# Patient Record
Sex: FEMALE | Race: WHITE | Employment: OTHER
[De-identification: names, ages, dates, MRNs, and addresses within clinical notes are randomized per-mention and may not be internally consistent; named-entity substitution may affect disease eponyms.]

---

## 2024-01-25 ENCOUNTER — APPOINTMENT (OUTPATIENT)
Facility: HOSPITAL | Age: 89
End: 2024-01-25
Payer: MEDICARE

## 2024-01-25 ENCOUNTER — HOSPITAL ENCOUNTER (EMERGENCY)
Facility: HOSPITAL | Age: 89
Discharge: HOME | End: 2024-01-25
Attending: EMERGENCY MEDICINE
Payer: MEDICARE

## 2024-01-25 VITALS
HEIGHT: 65 IN | SYSTOLIC BLOOD PRESSURE: 182 MMHG | OXYGEN SATURATION: 94 % | WEIGHT: 182 LBS | HEART RATE: 87 BPM | DIASTOLIC BLOOD PRESSURE: 104 MMHG | RESPIRATION RATE: 16 BRPM | BODY MASS INDEX: 30.32 KG/M2 | TEMPERATURE: 98.4 F

## 2024-01-25 DIAGNOSIS — S09.90XA INJURY OF HEAD, INITIAL ENCOUNTER: Primary | ICD-10-CM
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
## 2024-01-25 PROCEDURE — 72170 X-RAY EXAM OF PELVIS: CPT

## 2024-01-25 PROCEDURE — 71045 X-RAY EXAM CHEST 1 VIEW: CPT

## 2024-01-25 PROCEDURE — 70450 CT HEAD/BRAIN W/O DYE: CPT

## 2024-01-25 PROCEDURE — 99284 EMERGENCY DEPT VISIT MOD MDM: CPT

## 2024-01-25 ASSESSMENT — LIFESTYLE VARIABLES
HOW OFTEN DO YOU HAVE A DRINK CONTAINING ALCOHOL: NEVER
HOW MANY STANDARD DRINKS CONTAINING ALCOHOL DO YOU HAVE ON A TYPICAL DAY: PATIENT DOES NOT DRINK

## 2024-01-25 ASSESSMENT — PAIN - FUNCTIONAL ASSESSMENT: PAIN_FUNCTIONAL_ASSESSMENT: 0-10

## 2024-01-25 ASSESSMENT — PAIN SCALES - GENERAL: PAINLEVEL_OUTOF10: 0

## 2024-01-26 NOTE — ED TRIAGE NOTES
To ED via EMS c/o posterior head and sacral pain s/p fall in the shower. Reports had been laying in shower for approximately 7 hours before being found by family. (-) LOC (-) blood thinners

## 2024-01-26 NOTE — ED PROVIDER NOTES
VIEWS)   Final Result   Bilateral hip osteoarthritis and lumbosacral degenerative disc   disease without acute abnormality.           EMERGENCY DEPARTMENT COURSE and DIFFERENTIAL DIAGNOSIS/MDM   10:46 PM DDx, ED Course, and Reassessment: Patient had a mechanical fall.  She denies any injuries but will assess with chest x-ray and pelvis x-ray and then ambulate.    Clinical Management Tools:      Records Reviewed (source and summary of external notes): Prior medical records and Nursing notes    Vitals:    Vitals:    01/25/24 2020 01/25/24 2103   BP: (!) 214/105 (!) 198/95   Pulse: 83 63   Resp: 16 16   Temp: 98.3 °F (36.8 °C)    TempSrc: Oral    SpO2: 96% 97%   Weight: 82.6 kg (182 lb)    Height: 1.651 m (5' 5\")         ED COURSE       Sepsis Reassessment: Sepsis reassessment not applicable    Disposition Considerations (Tests not done, Shared Decision Making, Pt Expectation of Test or Treatment.):  Patient ambulates without difficulty.  She has no appreciable fractures.  Will treat with analgesic in the form of NSAIDs as an outpatient     Patient was given the following medications:  Medications - No data to display    CONSULTS: (Who and What was discussed)  None     Social Determinants affecting Dx or Tx: None    Smoking Cessation: Not Applicable    PROCEDURES   Unless otherwise noted above, none  Procedures      CRITICAL CARE TIME   Patient does not meet Critical Care Time, 0 minutes    ED FINAL IMPRESSION     1. Injury of head, initial encounter    2. Fall, initial encounter          DISPOSITION/PLAN   DISPOSITION Decision To Discharge 01/25/2024 10:45:44 PM    Discharge Note: The patient is stable for discharge home. The signs, symptoms, diagnosis, and discharge instructions have been discussed, understanding conveyed, and agreed upon. The patient is to follow up as recommended or return to ER should their symptoms worsen.      PATIENT REFERRED TO:  Roanld Cloud MD  [id number] [facility]